# Patient Record
Sex: MALE | Race: BLACK OR AFRICAN AMERICAN | URBAN - METROPOLITAN AREA
[De-identification: names, ages, dates, MRNs, and addresses within clinical notes are randomized per-mention and may not be internally consistent; named-entity substitution may affect disease eponyms.]

---

## 2024-02-28 ENCOUNTER — OV NP (OUTPATIENT)
Dept: URBAN - METROPOLITAN AREA CLINIC 118 | Facility: CLINIC | Age: 39
End: 2024-02-28

## 2024-02-28 VITALS
HEIGHT: 69 IN | HEART RATE: 86 BPM | WEIGHT: 211 LBS | TEMPERATURE: 99 F | BODY MASS INDEX: 31.25 KG/M2 | SYSTOLIC BLOOD PRESSURE: 115 MMHG | DIASTOLIC BLOOD PRESSURE: 78 MMHG

## 2024-02-28 PROBLEM — 235595009: Status: ACTIVE | Noted: 2024-02-28

## 2024-02-28 RX ORDER — OMEPRAZOLE 40 MG/1
1 CAPSULE 30 MINUTES BEFORE MORNING MEAL CAPSULE, DELAYED RELEASE ORAL ONCE A DAY
Qty: 30 | OUTPATIENT
Start: 2024-02-28

## 2024-02-28 RX ORDER — LEVOTHYROXINE SODIUM 300 UG/1
1 TABLET IN THE MORNING ON AN EMPTY STOMACH TABLET ORAL ONCE A DAY
Status: ACTIVE | COMMUNITY

## 2024-02-28 RX ORDER — OMEPRAZOLE 20 MG/1
1 CAPSULE 30 MINUTES BEFORE MORNING MEAL CAPSULE, DELAYED RELEASE ORAL ONCE A DAY
Status: ACTIVE | COMMUNITY

## 2024-05-02 ENCOUNTER — OFFICE VISIT (OUTPATIENT)
Dept: URBAN - METROPOLITAN AREA CLINIC 118 | Facility: CLINIC | Age: 39
End: 2024-05-02

## 2025-05-27 ENCOUNTER — DASHBOARD ENCOUNTERS (OUTPATIENT)
Age: 40
End: 2025-05-27

## 2025-05-27 ENCOUNTER — OFFICE VISIT (OUTPATIENT)
Dept: URBAN - METROPOLITAN AREA CLINIC 118 | Facility: CLINIC | Age: 40
End: 2025-05-27
Payer: COMMERCIAL

## 2025-05-27 DIAGNOSIS — K21.9 GASTROESOPHAGEAL REFLUX DISEASE, UNSPECIFIED WHETHER ESOPHAGITIS PRESENT: ICD-10-CM

## 2025-05-27 PROCEDURE — 99203 OFFICE O/P NEW LOW 30 MIN: CPT | Performed by: STUDENT IN AN ORGANIZED HEALTH CARE EDUCATION/TRAINING PROGRAM

## 2025-05-27 RX ORDER — LEVOTHYROXINE SODIUM 300 UG/1
1 TABLET IN THE MORNING ON AN EMPTY STOMACH TABLET ORAL ONCE A DAY
Status: ACTIVE | COMMUNITY

## 2025-05-27 RX ORDER — OMEPRAZOLE 40 MG/1
1 CAPSULE 30 MINUTES BEFORE MORNING MEAL CAPSULE, DELAYED RELEASE ORAL ONCE A DAY
Qty: 30 | Status: ACTIVE | COMMUNITY
Start: 2024-02-28

## 2025-05-27 RX ORDER — OMEPRAZOLE 40 MG/1
1 CAPSULE 30 MINUTES BEFORE MORNING MEAL CAPSULE, DELAYED RELEASE ORAL ONCE A DAY
Qty: 30 | Refills: 1 | OUTPATIENT
Start: 2025-05-27

## 2025-05-27 RX ORDER — OMEPRAZOLE 20 MG/1
1 CAPSULE 30 MINUTES BEFORE MORNING MEAL CAPSULE, DELAYED RELEASE ORAL ONCE A DAY
Status: ACTIVE | COMMUNITY

## 2025-05-27 NOTE — HPI-TODAY'S VISIT:
The patient is a 39-year-old male with history of thyroid cancer, s/p thyroidectomy 12 years ago, and longstanding GERD who presents for follow-up.  The patient was last seen in clinic in 2024.  At his last visit, H. pylori breath test followed by omeprazole were recommended due to worsening GERD. The patient unfortunately never got these done.  He states that about 2 weeks ago he was switched from his prior thyroid medication to a synthetic thyroid which led to a reaction (he had to take Benadryl at that time).  Since then, his entire GI tract has been off.  He does not feel sick, but notes that his "stomach is sour".  He started famotidine which helped and was doing Kumbucha prior to that.

## 2025-06-13 ENCOUNTER — TELEPHONE ENCOUNTER (OUTPATIENT)
Dept: URBAN - METROPOLITAN AREA CLINIC 118 | Facility: CLINIC | Age: 40
End: 2025-06-13

## 2025-06-13 ENCOUNTER — WEB ENCOUNTER (OUTPATIENT)
Dept: URBAN - METROPOLITAN AREA CLINIC 118 | Facility: CLINIC | Age: 40
End: 2025-06-13

## 2025-06-13 PROBLEM — 441773004: Status: ACTIVE | Noted: 2025-06-13

## 2025-06-13 RX ORDER — METRONIDAZOLE 500 MG/1
1 TABLET TABLET ORAL THREE TIMES A DAY
Qty: 42 TABLET | Refills: 0 | OUTPATIENT
Start: 2025-06-13 | End: 2025-06-27

## 2025-06-13 RX ORDER — OMEPRAZOLE 40 MG/1
1 CAPSULE 30 MINUTES BEFORE MORNING MEAL CAPSULE, DELAYED RELEASE ORAL ONCE A DAY
Qty: 30 | Refills: 1 | OUTPATIENT
Start: 2025-06-13

## 2025-06-13 RX ORDER — TETRACYCLINE HYDROCHLORIDE 500 MG/1
1 CAPSULE ON AN EMPTY STOMACH CAPSULE ORAL
Qty: 56 CAPSULE | Refills: 0 | OUTPATIENT
Start: 2025-06-13 | End: 2025-06-27

## 2025-06-13 RX ORDER — BISMUTH SUBSALICYLATE 262 MG/1
2 TABLETS EVERY 30 MINUTES TO 1 HOUR AS NEEDED TABLET, CHEWABLE ORAL
Qty: 112 | Refills: 0 | OUTPATIENT
Start: 2025-06-13 | End: 2025-06-27

## 2025-06-13 RX ORDER — ESOMEPRAZOLE MAGNESIUM 40 MG/1
1 CAPSULE CAPSULE, DELAYED RELEASE PELLETS ORAL ONCE A DAY
Qty: 30 CAPSULE | Refills: 1 | OUTPATIENT
Start: 2025-06-19